# Patient Record
Sex: MALE
[De-identification: names, ages, dates, MRNs, and addresses within clinical notes are randomized per-mention and may not be internally consistent; named-entity substitution may affect disease eponyms.]

---

## 2020-06-02 ENCOUNTER — APPOINTMENT (OUTPATIENT)
Dept: UROLOGY | Facility: CLINIC | Age: 30
End: 2020-06-02
Payer: COMMERCIAL

## 2020-06-02 VITALS
HEART RATE: 85 BPM | BODY MASS INDEX: 29.73 KG/M2 | WEIGHT: 185 LBS | DIASTOLIC BLOOD PRESSURE: 81 MMHG | TEMPERATURE: 98.1 F | HEIGHT: 66 IN | RESPIRATION RATE: 16 BRPM | SYSTOLIC BLOOD PRESSURE: 123 MMHG

## 2020-06-02 VITALS — TEMPERATURE: 98.3 F

## 2020-06-02 DIAGNOSIS — E34.9 ENDOCRINE DISORDER, UNSPECIFIED: ICD-10-CM

## 2020-06-02 PROBLEM — Z00.00 ENCOUNTER FOR PREVENTIVE HEALTH EXAMINATION: Status: ACTIVE | Noted: 2020-06-02

## 2020-06-02 PROCEDURE — 99204 OFFICE O/P NEW MOD 45 MIN: CPT

## 2020-06-02 NOTE — ASSESSMENT
[FreeTextEntry1] : This pleasant gentleman presents with a decreased libido and concerns regarding a low testosterone. He is a newly diagnosed diabetic and has decreased his hemoglobin A1c from 12 to 7.5. With this decrease he has noted improvement in his libido and erectile function together with improvement in his urinary dribbling and frequency. I have requested several baseline blood studies.   Urine analysis was requested.  I will make more specific recommendations after the results of the requested tests return. I believe some of his concerns stemmed from his diabetes and some are psychological. I discussed this with both he and his wife. We will discuss further after reviewing his blood status on a telemedicine consultation.\par \par Consultation: 40 minutes  20 minutes reviewing his history and performing a physical examination.  20 minutes reviewing his ultrasound, discussing treatment options, writing request for blood studies and writing his note..\par \par \par

## 2020-06-02 NOTE — PHYSICAL EXAM
[General Appearance - Well Developed] : well developed [General Appearance - Well Nourished] : well nourished [Normal Appearance] : normal appearance [Well Groomed] : well groomed [General Appearance - In No Acute Distress] : no acute distress [FreeTextEntry1] : status post abdominal lipectomy with large scar [Heart Rate And Rhythm] : Heart rate and rhythm were normal [Arterial Pulses Normal] : the pedal pulses were normal [Edema] : no peripheral edema [Respiration, Rhythm And Depth] : normal respiratory rhythm and effort [Exaggerated Use Of Accessory Muscles For Inspiration] : no accessory muscle use [Auscultation Breath Sounds / Voice Sounds] : lungs were clear to auscultation bilaterally [Chest Palpation] : palpation of the chest revealed no abnormalities [Lungs Percussion] : the lungs were normal to percussion [Bowel Sounds] : normal bowel sounds [Abdomen Soft] : soft [Abdomen Tenderness] : non-tender [Abdomen Mass (___ Cm)] : no abdominal mass palpated [Abdomen Hernia] : no hernia was discovered [Costovertebral Angle Tenderness] : no ~M costovertebral angle tenderness [Urethral Meatus] : meatus normal [Urinary Bladder Findings] : the bladder was normal on palpation [Scrotum] : the scrotum was normal [Epididymis] : the epididymides were normal [Testes Tenderness] : no tenderness of the testes [Testes Mass (___cm)] : there were no testicular masses [Anus Abnormality] : the anus and perineum were normal [No Prostate Nodules] : no prostate nodules [Normal Station and Gait] : the gait and station were normal for the patient's age [Skin Color & Pigmentation] : normal skin color and pigmentation [Skin Turgor] : supple [] : no rash [Skin Lesions] : no skin lesions [No Focal Deficits] : no focal deficits [Sensation] : the sensory exam was normal to light touch and pinprick [Oriented To Time, Place, And Person] : oriented to person, place, and time [Motor Exam] : the motor exam was normal [Affect] : the affect was normal [Mood] : the mood was normal [Not Anxious] : not anxious [No Palpable Adenopathy] : no palpable adenopathy [Cervical Lymph Nodes Enlarged Posterior Bilaterally] : posterior cervical [Cervical Lymph Nodes Enlarged Anterior Bilaterally] : anterior cervical [Supraclavicular Lymph Nodes Enlarged Bilaterally] : supraclavicular [Axillary Lymph Nodes Enlarged Bilaterally] : axillary [Femoral Lymph Nodes Enlarged Bilaterally] : femoral [Inguinal Lymph Nodes Enlarged Bilaterally] : inguinal

## 2020-06-02 NOTE — HISTORY OF PRESENT ILLNESS
[FreeTextEntry1] : Patient is a 29-year-old gentleman who presents with the chief complaint of a decreased libido and concerns regarding a low testosterone for evaluation. I reviewed the questionnaire he had completed with him in detail.  The patient recently had a normal testosterone level. 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 He has no known drug allergies.  His past medical history demonstrates no significant urologic issues.  In his present occupation he has no known toxin exposure.  He does smoke and drinks only socially.  He has no known drug allergies.  His review of systems is non-contributory. His family history is not significant.\par

## 2020-06-02 NOTE — LETTER BODY
[FreeTextEntry1] : Dear Dr. Germain,\par \par Thank you for referring your patient Ezra Powell for consultation for decreased libido and concern regarding his testosterone. I have requested several baseline blood studies. I will see the patient back in followup shortly and make further recommendations. I have attached a copy of my consultation note for your records.\par \par Thank you again for this kind referral. I will certainly keep you updated with further progress. Please do not hesitate to call me if you have any questions.\par \par Best regards,\par \par \par \par Sourav Bates M.D., PhD\par Professor of Urology\par    [facility] [employer]\par Director, Reproductive and Sexual Medicine\par    [facility]\par    [facility]\par

## 2020-06-03 LAB
25(OH)D3 SERPL-MCNC: 49.1 NG/ML
ALBUMIN SERPL ELPH-MCNC: 4.9 G/DL
ALP BLD-CCNC: 56 U/L
ALT SERPL-CCNC: 25 U/L
ANION GAP SERPL CALC-SCNC: 14 MMOL/L
AST SERPL-CCNC: 23 U/L
BASOPHILS # BLD AUTO: 0.03 K/UL
BASOPHILS NFR BLD AUTO: 0.5 %
BILIRUB SERPL-MCNC: 0.3 MG/DL
BUN SERPL-MCNC: 16 MG/DL
CALCIUM SERPL-MCNC: 9.8 MG/DL
CHLORIDE SERPL-SCNC: 105 MMOL/L
CHOLEST SERPL-MCNC: 201 MG/DL
CHOLEST/HDLC SERPL: 3.5 RATIO
CO2 SERPL-SCNC: 23 MMOL/L
CREAT SERPL-MCNC: 1.1 MG/DL
EOSINOPHIL # BLD AUTO: 0.12 K/UL
EOSINOPHIL NFR BLD AUTO: 2.1 %
ESTIMATED AVERAGE GLUCOSE: 171 MG/DL
ESTRADIOL SERPL-MCNC: 22 PG/ML
FSH SERPL-MCNC: 2.7 IU/L
GLUCOSE SERPL-MCNC: 132 MG/DL
HBA1C MFR BLD HPLC: 7.6 %
HCT VFR BLD CALC: 45.5 %
HDLC SERPL-MCNC: 57 MG/DL
HGB BLD-MCNC: 14.6 G/DL
IMM GRANULOCYTES NFR BLD AUTO: 0.3 %
LDLC SERPL CALC-MCNC: 127 MG/DL
LH SERPL-ACNC: 3 IU/L
LYMPHOCYTES # BLD AUTO: 1.54 K/UL
LYMPHOCYTES NFR BLD AUTO: 26.3 %
MAN DIFF?: NORMAL
MCHC RBC-ENTMCNC: 29.9 PG
MCHC RBC-ENTMCNC: 32.1 GM/DL
MCV RBC AUTO: 93 FL
MONOCYTES # BLD AUTO: 0.4 K/UL
MONOCYTES NFR BLD AUTO: 6.8 %
NEUTROPHILS # BLD AUTO: 3.74 K/UL
NEUTROPHILS NFR BLD AUTO: 64 %
PLATELET # BLD AUTO: 137 K/UL
POTASSIUM SERPL-SCNC: 4.9 MMOL/L
PROLACTIN SERPL-MCNC: 8.7 NG/ML
PROT SERPL-MCNC: 6.7 G/DL
RBC # BLD: 4.89 M/UL
RBC # FLD: 13 %
SODIUM SERPL-SCNC: 142 MMOL/L
TRIGL SERPL-MCNC: 86 MG/DL
TSH SERPL-ACNC: 1.09 UIU/ML
WBC # FLD AUTO: 5.85 K/UL

## 2020-06-24 LAB
TESTOST BND SERPL-MCNC: 14 PG/ML
TESTOST SERPL-MCNC: 304.8 NG/DL

## 2023-11-01 ENCOUNTER — OFFICE (OUTPATIENT)
Dept: URBAN - METROPOLITAN AREA CLINIC 35 | Facility: CLINIC | Age: 33
Setting detail: OPHTHALMOLOGY
End: 2023-11-01
Payer: COMMERCIAL

## 2023-11-01 DIAGNOSIS — H01.001: ICD-10-CM

## 2023-11-01 DIAGNOSIS — H01.004: ICD-10-CM

## 2023-11-01 DIAGNOSIS — E10.9: ICD-10-CM

## 2023-11-01 PROBLEM — H35.041 RETINAL MICROANEURYSMS; RIGHT EYE, LEFT EYE: Status: ACTIVE | Noted: 2023-11-01

## 2023-11-01 PROBLEM — H35.042 RETINAL MICROANEURYSMS; RIGHT EYE, LEFT EYE: Status: ACTIVE | Noted: 2023-11-01

## 2023-11-01 PROCEDURE — 92250 FUNDUS PHOTOGRAPHY W/I&R: CPT | Performed by: OPHTHALMOLOGY

## 2023-11-01 PROCEDURE — 92004 COMPRE OPH EXAM NEW PT 1/>: CPT | Performed by: OPHTHALMOLOGY

## 2023-11-01 ASSESSMENT — REFRACTION_CURRENTRX
OS_AXIS: 090
OD_SPHERE: -2.00
OS_VPRISM_DIRECTION: SV
OS_CYLINDER: -0.50
OD_OVR_VA: 20/
OD_CYLINDER: SPHERE
OS_SPHERE: -2.00
OS_OVR_VA: 20/
OD_VPRISM_DIRECTION: SV

## 2023-11-01 ASSESSMENT — REFRACTION_AUTOREFRACTION
OS_AXIS: 090
OS_SPHERE: -1.25
OS_CYLINDER: -0.50
OD_SPHERE: -1.50
OD_AXIS: 000
OD_CYLINDER: 0.00

## 2023-11-01 ASSESSMENT — SPHEQUIV_DERIVED
OD_SPHEQUIV: -1.5
OS_SPHEQUIV: -1.5

## 2023-11-01 ASSESSMENT — LID EXAM ASSESSMENTS
OD_BLEPHARITIS: RUL
OS_BLEPHARITIS: LUL

## 2023-11-01 ASSESSMENT — CONFRONTATIONAL VISUAL FIELD TEST (CVF)
OS_FINDINGS: FULL
OD_FINDINGS: FULL